# Patient Record
Sex: FEMALE | Race: BLACK OR AFRICAN AMERICAN
[De-identification: names, ages, dates, MRNs, and addresses within clinical notes are randomized per-mention and may not be internally consistent; named-entity substitution may affect disease eponyms.]

---

## 2019-03-28 ENCOUNTER — HOSPITAL ENCOUNTER (EMERGENCY)
Dept: HOSPITAL 61 - ER | Age: 24
Discharge: HOME | End: 2019-03-28
Payer: SELF-PAY

## 2019-03-28 VITALS — DIASTOLIC BLOOD PRESSURE: 77 MMHG | SYSTOLIC BLOOD PRESSURE: 121 MMHG

## 2019-03-28 VITALS — HEIGHT: 66 IN | BODY MASS INDEX: 24.43 KG/M2 | WEIGHT: 152 LBS

## 2019-03-28 DIAGNOSIS — R51: ICD-10-CM

## 2019-03-28 DIAGNOSIS — R50.9: ICD-10-CM

## 2019-03-28 DIAGNOSIS — J45.21: Primary | ICD-10-CM

## 2019-03-28 LAB
INFLUENZA A PATIENT: NEGATIVE
INFLUENZA B PATIENT: NEGATIVE

## 2019-03-28 PROCEDURE — 87804 INFLUENZA ASSAY W/OPTIC: CPT

## 2019-03-28 PROCEDURE — 94640 AIRWAY INHALATION TREATMENT: CPT

## 2019-03-28 PROCEDURE — 99284 EMERGENCY DEPT VISIT MOD MDM: CPT

## 2019-03-28 PROCEDURE — 87880 STREP A ASSAY W/OPTIC: CPT

## 2019-03-28 PROCEDURE — 71046 X-RAY EXAM CHEST 2 VIEWS: CPT

## 2019-03-28 PROCEDURE — 87070 CULTURE OTHR SPECIMN AEROBIC: CPT

## 2019-03-28 NOTE — PHYS DOC
Past Medical History


Past Medical History:  Asthma


 (KEVIN CHACON)


Past Surgical History:  No Surgical History


 (KEVIN CHACON)


Alcohol Use:  None


Drug Use:  None


 (KEVIN CHACON)





Adult General


Chief Complaint


Chief Complaint:  SHORTNESS OF BREATH





[facility] [place]


HPI





Patient is a 23  year old female with history of asthma who presents to the ED 

today complaining of cough, shortness of breath, symptoms began yesterday. 

Patient is also complaining of body aches and a headache since this morning. 

Describes the headache as throbbing and intermittent, rates the headache as 

mild. Denies anything specifically exacerbating or relieving the headache. She 

tried using her inhaler today with no success.


 (KEVIN CHACON)





Review of Systems


Review of Systems





Constitutional: Reports body aches Denies fever or chills []


Eyes: Denies change in visual acuity, redness, or eye pain []


HENT: Denies nasal congestion or sore throat []


Respiratory: Reports cough and shortness of breath []


Cardiovascular: No additional information not addressed in HPI []


GI: Denies abdominal pain, nausea, vomiting, bloody stools or diarrhea []


: Denies dysuria or hematuria []


Musculoskeletal: Denies back pain or joint pain []


Integument: Denies rash or skin lesions []


Neurologic: Reports headache,, focal weakness or sensory changes []








All other systems were reviewed and found to be within normal limits, except as 

documented in this note.


 (KEVIN CHACON)





Current Medications


Current Medications





Current Medications








 Medications


  (Trade)  Dose


 Ordered  Sig/Katy  Start Time


 Stop Time Status Last Admin


Dose Admin


 


 Acetaminophen


  (Tylenol)  1,000 mg  1X  ONCE  3/28/19 17:00


 3/28/19 17:01 DC 3/28/19 17:43


1,000 MG


 


 Albuterol/


 Ipratropium


  (Duoneb)  3 ml  1X  ONCE  3/28/19 17:00


 3/28/19 17:01 DC 3/28/19 17:14


3 ML


 


 Prednisone


  (Prednisone)  50 mg  1X  ONCE  3/28/19 17:00


 3/28/19 17:01 DC 3/28/19 17:43


50 MG





 (BABATUNDE NICHOLE DO)





Allergies


Allergies





Allergies








Coded Allergies Type Severity Reaction Last Updated Verified


 


  No Known Drug Allergies    3/28/19 No





 (BABATUNDE NICHOLE DO)





Physical Exam


Physical Exam





Constitutional: Well developed, well nourished, no acute distress, non-toxic 

appearance. []


HENT: Normocephalic, atraumatic, bilateral external ears normal, oropharynx 

moist, no oral exudates, nose normal. []


Eyes: PERRLA, EOMI, conjunctiva normal, no discharge. [] 


Neck: Normal range of motion, no tenderness, supple, no stridor. [] 


Cardiovascular:Heart rate regular rhythm, no murmur []


Lungs & Thorax:  Bilateral breath sounds clear to auscultation []


Abdomen: Bowel sounds normal, soft, no tenderness, no masses, no pulsatile 

masses. [] 


Skin: Warm, dry, no erythema, no rash. [] 


Back: No tenderness, no CVA tenderness. [] 


Extremities: No tenderness, no cyanosis, no clubbing, ROM intact, no edema. [] 


Neurologic: Alert and oriented X 3, normal motor function, normal sensory 

function, no focal deficits noted. []


Psychologic: Affect normal, judgement normal, mood normal. []


 (KEVIN CHACON)





Current Patient Data


Vital Signs





 Vital Signs








  Date Time  Temp Pulse Resp B/P (MAP) Pulse Ox O2 Delivery O2 Flow Rate FiO2


 


3/28/19 18:40  92 18 121/77 (92) 98 Room Air  


 


3/28/19 16:29 99.7       





 99.7       





 (BABATUNDE NICHOLE DO)


Lab Values





 Laboratory Tests








Test


 3/28/19


16:50 3/28/19


17:00


 


Group A Streptococcus Rapid


 Negative


(NEGATIVE) 





 


Influenza Type A Antigen


 


 Negative


(NEGATIVE)


 


Influenza Type B Antigen


 


 Negative


(NEGATIVE)





 (BABATUNDE NICHOLE DO)





EKG


EKG


[]


 (KEVIN CHACON)





Radiology/Procedures


Radiology/Procedures


[]


 (KEVIN CHACON)





Course & Med Decision Making


Course & Med Decision Making


Pertinent Labs and Imaging studies reviewed. (See chart for details)





This is a 23-year-old female patient with history of asthma presenting to the 

ED today complaining of cough, shortness of breath, symptoms began yesterday. 

Also complaining of headache. Negative meningeal signs. 





Patient was given a DuoNeb treatment and prednisone, lungs are clear. She is 

running a slight temp of 99.7. Patient Tylenol 1 g. Negative influenza A or B, 

negative rapid chest x-ray interpreted by Dr. Nichole is negative.





Patient was discharged with albuterol inhaler, prednisone for her asthma. She 

is instructed to take Tylenol or Motrin for fever or pain. Instructed to follow-

up with her doctor in 1-2 weeks. Provided return precautions and discharged in 

stable condition.








 (KEVIN CHACON)





Dragon Disclaimer


Dragon Disclaimer


This electronic medical record was generated, in whole or in part, using a 

voice recognition dictation system.


 (KEVIN CHACON)





Departure


Departure


Impression:  


 Primary Impression:  


 Asthma exacerbation


 Additional Impressions:  


 Fever


 Headache


Disposition:  01 HOME, SELF-CARE


Condition:  STABLE


Referrals:  


UNKNOWN PCP NAME (PCP)


Follow-up with your doctor in 1-2 weeks


Patient Instructions:  Asthma, Adult, Fever, Adult





Additional Instructions:  


You were evaluated in the emergency room with symptoms of asthma exacerbation 

as well as a viral illness. Use your breathing treatments as prescribed. Take 

prednisone until completed. Take Tylenol/ Motrin for fever or pain. Push 

fluids. Rest. Follow-up with your doctor in 1-2 weeks.


Scripts


Albuterol Sulfate (VENTOLIN HFA INHALER) 18 Gm Hfa.aer.ad


2 PUFF INH Q4HRS for FOR ASTHMA, #1 INHALER 0 Refills


   Prov: KEVIN CHACON         3/28/19 


Prednisone (PREDNISONE) 50 Mg Tablet


1 TAB PO DAILY, #4 TAB


   Prov: KEVIN CHACON         3/28/19





Attending Signature


Attending Signature


I have reviewed the PA/NP's note and plan of care. I was available for 

consultation as needed at all times during the patient's visit in the emergency 

department. I agree with the clinical impression, plan and disposition.


 (BABATUNDE NICHOLE DO)





Problem Qualifiers








 Primary Impression:  


 Asthma exacerbation


 Asthma severity:  mild  Asthma persistence:  intermittent  Qualified Codes:  

J45.21 - Mild intermittent asthma with (acute) exacerbation


 Additional Impressions:  


 Fever


 Fever type:  unspecified  Qualified Codes:  R50.9 - Fever, unspecified


 Headache


 Headache type:  unspecified  Headache chronicity pattern:  unspecified pattern

  Intractability:  not intractable  Qualified Codes:  R51 - Headache








KEVIN CHACON Mar 28, 2019 18:43


BABATUNDE NICHOLE DO Mar 31, 2019 09:49

## 2019-03-29 NOTE — RAD
EXAM: PA and Lateral Views of the Chest

 

DATE: 3/28/2019 6:10 PM

 

INDICATION:  SHORTNESS OF AIR X3 DAYS. Hx ASTHMA. 

 

COMPARISON: No Prior

 

FINDINGS:

The heart is not enlarged.

 

Mediastinal and hilar contours are normal.

 

No focal parenchymal airspace opacity.

 

No pleural effusion or pneumothorax.

 

 

 

IMPRESSION:

1.  No radiographic evidence for acute cardiopulmonary process.

 

Electronically signed by: Bryson Calhoun MD (3/29/2019 12:16 AM) [facility]